# Patient Record
Sex: FEMALE | Race: BLACK OR AFRICAN AMERICAN | Employment: UNEMPLOYED | URBAN - METROPOLITAN AREA
[De-identification: names, ages, dates, MRNs, and addresses within clinical notes are randomized per-mention and may not be internally consistent; named-entity substitution may affect disease eponyms.]

---

## 2024-03-24 ENCOUNTER — HOSPITAL ENCOUNTER (EMERGENCY)
Facility: HOSPITAL | Age: 44
Discharge: HOME | End: 2024-03-25
Attending: EMERGENCY MEDICINE | Admitting: EMERGENCY MEDICINE

## 2024-03-24 ENCOUNTER — APPOINTMENT (EMERGENCY)
Dept: CT IMAGING | Facility: HOSPITAL | Age: 44
End: 2024-03-24

## 2024-03-24 DIAGNOSIS — F19.10 SUBSTANCE ABUSE (HCC): Primary | ICD-10-CM

## 2024-03-24 LAB
2HR DELTA HS TROPONIN: 2 NG/L
ALBUMIN SERPL BCP-MCNC: 4 G/DL (ref 3.5–5)
ALP SERPL-CCNC: 61 U/L (ref 34–104)
ALT SERPL W P-5'-P-CCNC: 17 U/L (ref 7–52)
ANION GAP SERPL CALCULATED.3IONS-SCNC: 12 MMOL/L (ref 4–13)
APAP SERPL-MCNC: <2 UG/ML (ref 10–20)
AST SERPL W P-5'-P-CCNC: 20 U/L (ref 13–39)
BASOPHILS # BLD AUTO: 0.02 THOUSANDS/ÂΜL (ref 0–0.1)
BASOPHILS NFR BLD AUTO: 0 % (ref 0–1)
BILIRUB SERPL-MCNC: 0.33 MG/DL (ref 0.2–1)
BUN SERPL-MCNC: 15 MG/DL (ref 5–25)
CALCIUM SERPL-MCNC: 8.5 MG/DL (ref 8.4–10.2)
CARDIAC TROPONIN I PNL SERPL HS: 2 NG/L
CARDIAC TROPONIN I PNL SERPL HS: 4 NG/L
CHLORIDE SERPL-SCNC: 108 MMOL/L (ref 96–108)
CO2 SERPL-SCNC: 19 MMOL/L (ref 21–32)
CREAT SERPL-MCNC: 0.78 MG/DL (ref 0.6–1.3)
EOSINOPHIL # BLD AUTO: 0.15 THOUSAND/ÂΜL (ref 0–0.61)
EOSINOPHIL NFR BLD AUTO: 1 % (ref 0–6)
ERYTHROCYTE [DISTWIDTH] IN BLOOD BY AUTOMATED COUNT: 12.7 % (ref 11.6–15.1)
ETHANOL SERPL-MCNC: 203 MG/DL
GFR SERPL CREATININE-BSD FRML MDRD: 91 ML/MIN/1.73SQ M
GLUCOSE SERPL-MCNC: 117 MG/DL (ref 65–140)
GLUCOSE SERPL-MCNC: 97 MG/DL (ref 65–140)
HCT VFR BLD AUTO: 42.8 % (ref 34.8–46.1)
HGB BLD-MCNC: 14 G/DL (ref 11.5–15.4)
IMM GRANULOCYTES # BLD AUTO: 0.02 THOUSAND/UL (ref 0–0.2)
IMM GRANULOCYTES NFR BLD AUTO: 0 % (ref 0–2)
LYMPHOCYTES # BLD AUTO: 2.26 THOUSANDS/ÂΜL (ref 0.6–4.47)
LYMPHOCYTES NFR BLD AUTO: 22 % (ref 14–44)
MCH RBC QN AUTO: 28.1 PG (ref 26.8–34.3)
MCHC RBC AUTO-ENTMCNC: 32.7 G/DL (ref 31.4–37.4)
MCV RBC AUTO: 86 FL (ref 82–98)
MONOCYTES # BLD AUTO: 0.63 THOUSAND/ÂΜL (ref 0.17–1.22)
MONOCYTES NFR BLD AUTO: 6 % (ref 4–12)
NEUTROPHILS # BLD AUTO: 7.4 THOUSANDS/ÂΜL (ref 1.85–7.62)
NEUTS SEG NFR BLD AUTO: 71 % (ref 43–75)
NRBC BLD AUTO-RTO: 0 /100 WBCS
PLATELET # BLD AUTO: 258 THOUSANDS/UL (ref 149–390)
PMV BLD AUTO: 10.6 FL (ref 8.9–12.7)
POTASSIUM SERPL-SCNC: 3.5 MMOL/L (ref 3.5–5.3)
PROT SERPL-MCNC: 6.6 G/DL (ref 6.4–8.4)
RBC # BLD AUTO: 4.98 MILLION/UL (ref 3.81–5.12)
SALICYLATES SERPL-MCNC: <5 MG/DL (ref 3–20)
SODIUM SERPL-SCNC: 139 MMOL/L (ref 135–147)
WBC # BLD AUTO: 10.48 THOUSAND/UL (ref 4.31–10.16)

## 2024-03-24 PROCEDURE — 85025 COMPLETE CBC W/AUTO DIFF WBC: CPT | Performed by: PHYSICIAN ASSISTANT

## 2024-03-24 PROCEDURE — 80143 DRUG ASSAY ACETAMINOPHEN: CPT | Performed by: PHYSICIAN ASSISTANT

## 2024-03-24 PROCEDURE — 93005 ELECTROCARDIOGRAM TRACING: CPT

## 2024-03-24 PROCEDURE — 84484 ASSAY OF TROPONIN QUANT: CPT | Performed by: PHYSICIAN ASSISTANT

## 2024-03-24 PROCEDURE — 82948 REAGENT STRIP/BLOOD GLUCOSE: CPT

## 2024-03-24 PROCEDURE — 99284 EMERGENCY DEPT VISIT MOD MDM: CPT

## 2024-03-24 PROCEDURE — 70450 CT HEAD/BRAIN W/O DYE: CPT

## 2024-03-24 PROCEDURE — 80179 DRUG ASSAY SALICYLATE: CPT | Performed by: PHYSICIAN ASSISTANT

## 2024-03-24 PROCEDURE — 96360 HYDRATION IV INFUSION INIT: CPT

## 2024-03-24 PROCEDURE — 82077 ASSAY SPEC XCP UR&BREATH IA: CPT | Performed by: PHYSICIAN ASSISTANT

## 2024-03-24 PROCEDURE — 80053 COMPREHEN METABOLIC PANEL: CPT | Performed by: PHYSICIAN ASSISTANT

## 2024-03-24 PROCEDURE — 99284 EMERGENCY DEPT VISIT MOD MDM: CPT | Performed by: PHYSICIAN ASSISTANT

## 2024-03-24 PROCEDURE — 36415 COLL VENOUS BLD VENIPUNCTURE: CPT | Performed by: PHYSICIAN ASSISTANT

## 2024-03-24 RX ORDER — NALOXONE HYDROCHLORIDE 1 MG/ML
2 INJECTION PARENTERAL ONCE
Status: COMPLETED | OUTPATIENT
Start: 2024-03-24 | End: 2024-03-24

## 2024-03-24 RX ADMIN — SODIUM CHLORIDE 1000 ML: 0.9 INJECTION, SOLUTION INTRAVENOUS at 20:16

## 2024-03-25 VITALS
RESPIRATION RATE: 16 BRPM | OXYGEN SATURATION: 98 % | HEART RATE: 66 BPM | TEMPERATURE: 98.5 F | DIASTOLIC BLOOD PRESSURE: 68 MMHG | SYSTOLIC BLOOD PRESSURE: 112 MMHG

## 2024-03-25 LAB
ATRIAL RATE: 62 BPM
P AXIS: 85 DEGREES
PR INTERVAL: 132 MS
QRS AXIS: 72 DEGREES
QRSD INTERVAL: 70 MS
QT INTERVAL: 412 MS
QTC INTERVAL: 418 MS
T WAVE AXIS: 39 DEGREES
VENTRICULAR RATE: 62 BPM

## 2024-03-25 PROCEDURE — 93010 ELECTROCARDIOGRAM REPORT: CPT | Performed by: STUDENT IN AN ORGANIZED HEALTH CARE EDUCATION/TRAINING PROGRAM

## 2024-03-25 NOTE — ED PROVIDER NOTES
History  Chief Complaint   Patient presents with    Overdose - Accidental     Pt found unresponsive in her room by friends/family.  + AOB.  Given Narcan 2 mg IM and 0.5 mg IV by EMS.  # 18 g Saline Lock Rt AC.  Glucose 105.  Unresponsive upon arrival.  Resps regular.       44-year-old female without known past medical history presents via EMS after being found unresponsive.  Patient was found in her room by family and friends not responding to her name.  Patient was given 2 mg of IM Narcan followed by 0.5 mg of IV Narcan with some positive response but continues to not respond to name.  Patient cannot contribute to HPI at this time however is protecting own airway and in no obvious acute respiratory distress.       History provided by:  Patient   used: No        None       No past medical history on file.    No past surgical history on file.    No family history on file.  I have reviewed and agree with the history as documented.    No existing history information found.  No existing history information found.       Review of Systems   Unable to perform ROS: Patient unresponsive       Physical Exam  Physical Exam  Constitutional:       General: She is not in acute distress.     Appearance: She is well-developed. She is not diaphoretic.      Comments: Unresponsive    Eyes:      Pupils: Pupils are equal, round, and reactive to light.      Comments: Reactive    Cardiovascular:      Rate and Rhythm: Normal rate and regular rhythm.   Pulmonary:      Effort: Pulmonary effort is normal. No respiratory distress.      Breath sounds: Normal breath sounds.   Abdominal:      General: Bowel sounds are normal. There is no distension.      Palpations: Abdomen is soft.   Musculoskeletal:         General: Normal range of motion.      Cervical back: Normal range of motion and neck supple.   Skin:     General: Skin is warm and dry.   Neurological:      Mental Status: She is alert and oriented to person, place, and  time.      Comments: GCS 10          Vital Signs  ED Triage Vitals [03/24/24 2010]   Temperature Pulse Respirations Blood Pressure SpO2   98.3 °F (36.8 °C) 90 13 113/56 96 %      Temp Source Heart Rate Source Patient Position - Orthostatic VS BP Location FiO2 (%)   Axillary Monitor -- Left arm --      Pain Score       No Pain           Vitals:    03/24/24 2200 03/24/24 2216 03/24/24 2335 03/25/24 0216   BP: 98/60 98/66 99/62 94/64   Pulse: 65 66 78 62         Visual Acuity      ED Medications  Medications   naloxone (FOR EMS ONLY) (NARCAN) 2 MG/2ML injection 4 mg (0 mg Does not apply Given to EMS 3/24/24 2012)   sodium chloride 0.9 % bolus 1,000 mL (0 mL Intravenous Stopped 3/24/24 2116)       Diagnostic Studies  Results Reviewed       Procedure Component Value Units Date/Time    HS Troponin I 2hr [[id number]]  (Normal) Collected: 03/24/24 2216    Lab Status: Final result Specimen: Blood from Arm, Right Updated: 03/24/24 2243     hs TnI 2hr 4 ng/L      Delta 2hr hsTnI 2 ng/L     HS Troponin 0hr (reflex protocol) [[id number]]  (Normal) Collected: 03/24/24 2011    Lab Status: Final result Specimen: Blood from Arm, Right Updated: 03/24/24 2048     hs TnI 0hr 2 ng/L     Comprehensive metabolic panel [[id number]]  (Abnormal) Collected: 03/24/24 2011    Lab Status: Final result Specimen: Blood from Arm, Right Updated: 03/24/24 2042     Sodium 139 mmol/L      Potassium 3.5 mmol/L      Chloride 108 mmol/L      CO2 19 mmol/L      ANION GAP 12 mmol/L      BUN 15 mg/dL      Creatinine 0.78 mg/dL      Glucose 97 mg/dL      Calcium 8.5 mg/dL      AST 20 U/L      ALT 17 U/L      Alkaline Phosphatase 61 U/L      Total Protein 6.6 g/dL      Albumin 4.0 g/dL      Total Bilirubin 0.33 mg/dL      eGFR 91 ml/min/1.73sq m     Narrative:      [employer] guidelines for Chronic Kidney Disease (CKD):     Stage 1 with normal or high GFR (GFR > 90 mL/min/1.73 square meters)    Stage 2 Mild CKD (GFR = 60-89 mL/min/1.73  square meters)    Stage 3A Moderate CKD (GFR = 45-59 mL/min/1.73 square meters)    Stage 3B Moderate CKD (GFR = 30-44 mL/min/1.73 square meters)    Stage 4 Severe CKD (GFR = 15-29 mL/min/1.73 square meters)    Stage 5 End Stage CKD (GFR <15 mL/min/1.73 square meters)  Note: GFR calculation is accurate only with a steady state creatinine    Salicylate level [[id number]]  (Normal) Collected: 03/24/24 2011    Lab Status: Final result Specimen: Blood from Arm, Right Updated: 03/24/24 2042     Salicylate Lvl <5 mg/dL     Acetaminophen level-If concentration is detectable, please discuss with medical  on call. [[id number]]  (Abnormal) Collected: 03/24/24 2011    Lab Status: Final result Specimen: Blood from Arm, Right Updated: 03/24/24 2042     Acetaminophen Level <2 ug/mL     Ethanol [[id number]]  (Abnormal) Collected: 03/24/24 2011    Lab Status: Final result Specimen: Blood from Arm, Right Updated: 03/24/24 2039     Ethanol Lvl 203 mg/dL     CBC and differential [[id number]]  (Abnormal) Collected: 03/24/24 2011    Lab Status: Final result Specimen: Blood from Arm, Right Updated: 03/24/24 2023     WBC 10.48 Thousand/uL      RBC 4.98 Million/uL      Hemoglobin 14.0 g/dL      Hematocrit 42.8 %      MCV 86 fL      MCH 28.1 pg      MCHC 32.7 g/dL      RDW 12.7 %      MPV 10.6 fL      Platelets 258 Thousands/uL      nRBC 0 /100 WBCs      Neutrophils Relative 71 %      Immature Grans % 0 %      Lymphocytes Relative 22 %      Monocytes Relative 6 %      Eosinophils Relative 1 %      Basophils Relative 0 %      Neutrophils Absolute 7.40 Thousands/µL      Absolute Immature Grans 0.02 Thousand/uL      Absolute Lymphocytes 2.26 Thousands/µL      Absolute Monocytes 0.63 Thousand/µL      Eosinophils Absolute 0.15 Thousand/µL      Basophils Absolute 0.02 Thousands/µL     Fingerstick Glucose (POCT) [[id number]]  (Normal) Collected: 03/24/24 2006    Lab Status: Final result Updated: 03/24/24 2014     POC Glucose 117 mg/dl                     CT head without contrast   Final Result by Shantel Estes MD (03/24 2134)      No acute intracranial abnormality.                  Workstation performed: [id number]                    Procedures  ECG 12 Lead Documentation Only    Date/Time: 3/24/2024 10:40 PM    Performed by: Tatyana Hanna PA-C  Authorized by: Tatyana Hanna PA-C    Indications / Diagnosis:  AMS  ECG reviewed by me, the ED Provider: yes    Patient location:  ED  Interpretation:     Interpretation: normal    Rate:     ECG rate:  62    ECG rate assessment: normal    Rhythm:     Rhythm: sinus rhythm    Ectopy:     Ectopy: none    QRS:     QRS axis:  Normal    QRS intervals:  Normal  Conduction:     Conduction: normal    ST segments:     ST segments:  Normal  T waves:     T waves: normal             ED Course                                             Medical Decision Making  Patient presented being found unresponsive by family members after alcohol use.  Patient received Narcan with some positive response by EMS but was still intoxicated.  Patient had benign medical evaluation emergency department including benign laboratory evaluation and head CT.  Patient was observed in the emergency department for approximately 7-1/2 hours at which point patient was reevaluated by myself and was clinically sober.  Patient was alert and oriented to person place and time with steady gait without slurred speech.  Patient was pleasant and cooperative and admitted to drinking alcohol.  Patient denied SI or HI.  Patient was offered a lift but states she prefers to walk home and that she lives close.  Patient was given all her belongings and ambulated the department without difficulty.  Repeat GCS was 15    Amount and/or Complexity of Data Reviewed  Labs: ordered.  Radiology: ordered.             Disposition  Final diagnoses:   Substance abuse (HCC)     Time reflects when diagnosis was documented in both MDM as applicable and the Disposition  within this note       Time User Action Codes Description Comment    3/25/2024  3:37 AM Tatyana Hanna Add [F19.10] Substance abuse (HCC)           ED Disposition       ED Disposition   Discharge    Condition   Stable    Date/Time   Mon Mar 25, 2024  3:37 AM    Comment   Angie Warner discharge to home/self care.                   Follow-up Information       Follow up With Specialties Details Why Contact Info Additional Information    [facility] Emergency Medicine Go to  If symptoms worsen [address] [facility] [address] [phone] [facility]            Patient's Medications    No medications on file       No discharge procedures on file.    PDMP Review       None            ED Provider  Electronically Signed by             Tatyana Hanna PA-C  03/25/24 0336

## 2024-03-25 NOTE — ED NOTES
"Pt awake, alert, oriented.  Pt ready to discharge.  Pt has no shoes.  Offered Ly[employer] ride; pt declined.  States she walks barefoot \"all the time\".    Pt's belongings returned to her, most notably cell phone, keys, passport.  Pt departed ambulatory with a steady gait.     Mark Jerry RN  03/25/24 0352    "

## 2024-03-25 NOTE — ED NOTES
Pt still sleeping.  Arouses minimally to calling; immediately goes back to sleep.  SR on monitor.  Resps easy and regular.       Mark Jerry RN  03/25/24 0056

## 2024-03-25 NOTE — ED NOTES
Pt awakens to calling, but offers no verbal response.  Immediately dozes off as soon as stimulation ceases.    SR on monitor.  Resps easy and unlabored.  BBS CTA.  Protective reflexes intact.  Bedrails up for safety.       Mark Jerry RN  03/24/24 0172